# Patient Record
(demographics unavailable — no encounter records)

---

## 2024-10-22 NOTE — HISTORY OF PRESENT ILLNESS
[FreeTextEntry1] : cpx [de-identified] : cpx zoloft 150, remeron 15, synthroid 50, rare ativan--mood stable walks vegetarian

## 2025-07-22 NOTE — HISTORY OF PRESENT ILLNESS
[FreeTextEntry1] : thyroid, malasise [de-identified] : tapering zoloft from 150mg to 50mg occ lethargy/fatigue occ fatigue with gasping during sleep and daytime somulence insomnia better on remeron